# Patient Record
Sex: FEMALE | Race: OTHER | NOT HISPANIC OR LATINO | Employment: FULL TIME | URBAN - METROPOLITAN AREA
[De-identification: names, ages, dates, MRNs, and addresses within clinical notes are randomized per-mention and may not be internally consistent; named-entity substitution may affect disease eponyms.]

---

## 2025-05-12 ENCOUNTER — APPOINTMENT (OUTPATIENT)
Dept: PRIMARY CARE | Facility: CLINIC | Age: 35
End: 2025-05-12

## 2025-05-12 ENCOUNTER — TELEPHONE (OUTPATIENT)
Dept: PRIMARY CARE | Facility: CLINIC | Age: 35
End: 2025-05-12

## 2025-05-12 VITALS
HEIGHT: 60 IN | WEIGHT: 243 LBS | SYSTOLIC BLOOD PRESSURE: 124 MMHG | OXYGEN SATURATION: 97 % | HEART RATE: 96 BPM | BODY MASS INDEX: 47.71 KG/M2 | DIASTOLIC BLOOD PRESSURE: 84 MMHG

## 2025-05-12 DIAGNOSIS — L68.0 HIRSUTISM: ICD-10-CM

## 2025-05-12 DIAGNOSIS — L65.9 HAIR LOSS: ICD-10-CM

## 2025-05-12 DIAGNOSIS — E66.01 MORBID OBESITY (MULTI): ICD-10-CM

## 2025-05-12 DIAGNOSIS — R73.03 PREDIABETES: ICD-10-CM

## 2025-05-12 DIAGNOSIS — R25.1 TREMOR OF BOTH HANDS: Primary | ICD-10-CM

## 2025-05-12 DIAGNOSIS — F90.8 OTHER SPECIFIED ATTENTION DEFICIT HYPERACTIVITY DISORDER (ADHD): Primary | ICD-10-CM

## 2025-05-12 DIAGNOSIS — F90.8 OTHER SPECIFIED ATTENTION DEFICIT HYPERACTIVITY DISORDER (ADHD): ICD-10-CM

## 2025-05-12 PROBLEM — N92.6 IRREGULAR MENSES: Status: RESOLVED | Noted: 2022-08-03 | Resolved: 2025-05-12

## 2025-05-12 PROBLEM — R74.8 ELEVATED ALKALINE PHOSPHATASE LEVEL: Status: RESOLVED | Noted: 2022-08-03 | Resolved: 2025-05-12

## 2025-05-12 PROBLEM — R73.9 BLOOD GLUCOSE ELEVATED: Status: RESOLVED | Noted: 2019-11-08 | Resolved: 2025-05-12

## 2025-05-12 PROBLEM — R73.9 BLOOD GLUCOSE ELEVATED: Status: ACTIVE | Noted: 2019-11-08

## 2025-05-12 PROBLEM — N92.1 MENORRHAGIA WITH IRREGULAR CYCLE: Status: ACTIVE | Noted: 2023-02-03

## 2025-05-12 PROBLEM — M79.89 SWELLING OF BOTH LOWER EXTREMITIES: Status: RESOLVED | Noted: 2022-08-03 | Resolved: 2025-05-12

## 2025-05-12 PROBLEM — F34.1 PERSISTENT DEPRESSIVE DISORDER: Status: ACTIVE | Noted: 2022-07-18

## 2025-05-12 PROBLEM — R41.840 INATTENTION: Status: ACTIVE | Noted: 2022-08-03

## 2025-05-12 PROBLEM — E07.89 OTHER SPECIFIED DISORDERS OF THYROID: Status: ACTIVE | Noted: 2019-11-08

## 2025-05-12 PROBLEM — R74.8 ELEVATED ALKALINE PHOSPHATASE LEVEL: Status: ACTIVE | Noted: 2022-08-03

## 2025-05-12 PROBLEM — N92.6 MISSED MENSES: Status: RESOLVED | Noted: 2023-02-03 | Resolved: 2025-05-12

## 2025-05-12 PROBLEM — L29.9 EAR ITCHING: Status: RESOLVED | Noted: 2023-10-20 | Resolved: 2025-05-12

## 2025-05-12 PROBLEM — F33.0 MILD RECURRENT MAJOR DEPRESSION: Status: ACTIVE | Noted: 2019-11-09

## 2025-05-12 PROBLEM — E78.2 MIXED HYPERLIPIDEMIA: Status: RESOLVED | Noted: 2022-08-03 | Resolved: 2025-05-12

## 2025-05-12 PROBLEM — J30.9 ALLERGIC RHINITIS: Status: ACTIVE | Noted: 2023-10-20

## 2025-05-12 PROBLEM — N93.8 DUB (DYSFUNCTIONAL UTERINE BLEEDING): Status: ACTIVE | Noted: 2025-05-12

## 2025-05-12 PROBLEM — R74.01 TRANSAMINITIS: Status: RESOLVED | Noted: 2022-08-03 | Resolved: 2025-05-12

## 2025-05-12 PROBLEM — F41.1 GENERALIZED ANXIETY DISORDER: Status: ACTIVE | Noted: 2022-07-18

## 2025-05-12 PROBLEM — E78.2 MIXED HYPERLIPIDEMIA: Status: ACTIVE | Noted: 2022-08-03

## 2025-05-12 PROCEDURE — G8433 SCR FOR DEP NOT CPT DOC RSN: HCPCS | Performed by: FAMILY MEDICINE

## 2025-05-12 PROCEDURE — 99204 OFFICE O/P NEW MOD 45 MIN: CPT | Performed by: FAMILY MEDICINE

## 2025-05-12 PROCEDURE — 3008F BODY MASS INDEX DOCD: CPT | Performed by: FAMILY MEDICINE

## 2025-05-12 PROCEDURE — 1036F TOBACCO NON-USER: CPT | Performed by: FAMILY MEDICINE

## 2025-05-12 ASSESSMENT — COLUMBIA-SUICIDE SEVERITY RATING SCALE - C-SSRS
1. IN THE PAST MONTH, HAVE YOU WISHED YOU WERE DEAD OR WISHED YOU COULD GO TO SLEEP AND NOT WAKE UP?: NO
2. HAVE YOU ACTUALLY HAD ANY THOUGHTS OF KILLING YOURSELF?: NO
6. HAVE YOU EVER DONE ANYTHING, STARTED TO DO ANYTHING, OR PREPARED TO DO ANYTHING TO END YOUR LIFE?: NO

## 2025-05-12 ASSESSMENT — ENCOUNTER SYMPTOMS
DECREASED CONCENTRATION: 1
TREMORS: 1

## 2025-05-12 ASSESSMENT — PATIENT HEALTH QUESTIONNAIRE - PHQ9
1. LITTLE INTEREST OR PLEASURE IN DOING THINGS: SEVERAL DAYS
SUM OF ALL RESPONSES TO PHQ9 QUESTIONS 1 AND 2: 2
2. FEELING DOWN, DEPRESSED OR HOPELESS: SEVERAL DAYS
10. IF YOU CHECKED OFF ANY PROBLEMS, HOW DIFFICULT HAVE THESE PROBLEMS MADE IT FOR YOU TO DO YOUR WORK, TAKE CARE OF THINGS AT HOME, OR GET ALONG WITH OTHER PEOPLE: NOT DIFFICULT AT ALL

## 2025-05-12 NOTE — PROGRESS NOTES
Subjective   Patient ID: Solange Chavarria is a 34 y.o. female who presents for New Patient Visit (Establish care -updated Pt feels slight tremor in right hand for about month /Pt has weight concerns wants to lose weight and has hair loss).    The patient is returning to the practice and is transferring her care from Padmini Tolentino MD.  She is here to establish care.    1) Tremor in both hands: began in February, right hand more noticeable, ADLs not affected, patient is left-handed.     2) Hair loss: began in 2019, getting worse, discussed with previous PCP but was not referred, would like to see derm.    3) Morbid obesity: uncontrolled, trying to eat more protein, fruit, and fiber, would like to see a dietician.  4) Prediabetes: current status unknown, last HbA1C 5.8% in 2023, treated with metformin until her she lost her medical insurance, tries to follow a low carb diet, exercises 1 day a week, plans to increase exercise.    5) ADHD: diagnosed in 2022, treated with medication for 1.5 years by psychiatrist, had side effects, patient stopped medication, now sees a counselor and does not take medication.    6) Hirsutism: patient adds at end of visit that she has noticed facial hair along the chin.  Review of Systems   Endocrine:        Positive for hirsutism   Skin:         Positive for hair loss and facial hair.   Neurological:  Positive for tremors.   Psychiatric/Behavioral:  Positive for decreased concentration.      Objective   /84 (BP Location: Left arm, Patient Position: Sitting, BP Cuff Size: Adult)   Pulse 96   Ht 1.524 m (5')   Wt 110 kg (243 lb)   LMP 04/04/2025 (Approximate)   SpO2 97%   BMI 47.46 kg/m²     Physical Exam  Vitals and nursing note reviewed.   Constitutional:       General: She is not in acute distress.     Appearance: Normal appearance. She is obese. She is not ill-appearing.   Neck:      Comments: No thyromegaly  Cardiovascular:      Rate and Rhythm: Normal rate and  regular rhythm.      Heart sounds: Normal heart sounds.   Pulmonary:      Effort: Pulmonary effort is normal.      Breath sounds: Normal breath sounds.   Musculoskeletal:      Cervical back: Normal range of motion and neck supple.   Neurological:      Mental Status: She is alert.   Assessment/Plan   Diagnoses and all orders for this visit:  Tremor of both hands  Chronic  Right worse than left.  CBC and Auto Differential and TSH with reflex to Free T4 if abnormal ordered.  If labs normal will refer to neurology.  Hair loss  Chronic  CBC and Auto Differential and TSH with reflex to Free T4 if abnormal ordered.  Will notify with results.  Referral to Dermatology  Await input.  Follow up as directed.    Morbid obesity (Multi)  Uncontrolled  TSH with reflex to Free T4 if abnormal, Comprehensive Metabolic Panel, and Hemoglobin A1C ordered.  Will notify with results.  Referral to Nutrition Services  Await input.  Healthy diet.  Regular exercise.  Follow up in August for CPE.  Prediabetes  Current status unknown  Comprehensive Metabolic Panel and Hemoglobin A1C ordered.  Will notify with results.  Low carb diet.  Regular exercise.  Manage weight.  Follow up in August for CPE.  Other specified attention deficit hyperactivity disorder (ADHD)  Controlled  No medication taken.  Keep follow-up appointments with counselor.  Follow up in August for CPE.  Hirsutism  New  Referral to Dermatology  Await input.  Follow up as directed.   Other orders  -     Follow Up In Primary Care - Health Maintenance; Future

## 2025-08-28 DIAGNOSIS — R73.03 PREDIABETES: ICD-10-CM

## 2025-09-04 ENCOUNTER — APPOINTMENT (OUTPATIENT)
Dept: PRIMARY CARE | Facility: CLINIC | Age: 35
End: 2025-09-04
Payer: COMMERCIAL